# Patient Record
Sex: MALE | Race: OTHER | HISPANIC OR LATINO | URBAN - METROPOLITAN AREA
[De-identification: names, ages, dates, MRNs, and addresses within clinical notes are randomized per-mention and may not be internally consistent; named-entity substitution may affect disease eponyms.]

---

## 2023-09-09 ENCOUNTER — EMERGENCY (EMERGENCY)
Facility: HOSPITAL | Age: 33
LOS: 1 days | Discharge: HOME | End: 2023-09-09
Attending: EMERGENCY MEDICINE
Payer: MEDICAID

## 2023-09-09 VITALS
DIASTOLIC BLOOD PRESSURE: 70 MMHG | RESPIRATION RATE: 16 BRPM | OXYGEN SATURATION: 98 % | TEMPERATURE: 98 F | HEIGHT: 70 IN | WEIGHT: 214.95 LBS | SYSTOLIC BLOOD PRESSURE: 107 MMHG | HEART RATE: 61 BPM

## 2023-09-09 PROCEDURE — 96375 TX/PRO/DX INJ NEW DRUG ADDON: CPT

## 2023-09-09 PROCEDURE — 99284 EMERGENCY DEPT VISIT MOD MDM: CPT

## 2023-09-09 PROCEDURE — 99284 EMERGENCY DEPT VISIT MOD MDM: CPT | Mod: 25

## 2023-09-09 PROCEDURE — 96374 THER/PROPH/DIAG INJ IV PUSH: CPT

## 2023-09-09 RX ORDER — METOCLOPRAMIDE HCL 10 MG
10 TABLET ORAL ONCE
Refills: 0 | Status: COMPLETED | OUTPATIENT
Start: 2023-09-09 | End: 2023-09-09

## 2023-09-09 RX ORDER — SODIUM CHLORIDE 9 MG/ML
1000 INJECTION INTRAMUSCULAR; INTRAVENOUS; SUBCUTANEOUS ONCE
Refills: 0 | Status: COMPLETED | OUTPATIENT
Start: 2023-09-09 | End: 2023-09-09

## 2023-09-09 RX ORDER — KETOROLAC TROMETHAMINE 30 MG/ML
15 SYRINGE (ML) INJECTION ONCE
Refills: 0 | Status: DISCONTINUED | OUTPATIENT
Start: 2023-09-09 | End: 2023-09-09

## 2023-09-09 RX ADMIN — SODIUM CHLORIDE 1000 MILLILITER(S): 9 INJECTION INTRAMUSCULAR; INTRAVENOUS; SUBCUTANEOUS at 18:28

## 2023-09-09 RX ADMIN — Medication 10 MILLIGRAM(S): at 18:28

## 2023-09-09 RX ADMIN — Medication 15 MILLIGRAM(S): at 18:28

## 2023-09-09 NOTE — ED PROVIDER NOTE - PROGRESS NOTE DETAILS
Jarad PGY3  Patient reassessed and reports that his headache is completely gone. Will DC with PMD follow up. Patient has an appointment with neurology in October.

## 2023-09-09 NOTE — ED ADULT NURSE NOTE - OBJECTIVE STATEMENT
States he has headache and sensitivity to light since this morning with episode of dizziness this morning .Denies fall or injury.

## 2023-09-09 NOTE — ED PROVIDER NOTE - PHYSICAL EXAMINATION
Vitals: I have reviewed the patients vital signs  General: Well dressed, well appearing, no acute distress  HEENT: Atraumatic, normocephalic, airway patent  Eyes: EOMI, tracking appropriately  Neck: no tracheal deviation, no JVD, no nuchal rigidity   Chest/Lungs: symmetric chest rise, speaking in complete sentences, CTA BL   Heart: skin and extremities well perfused, regular rate and rhythm  Abdomen: soft, nontender and nondistended   Neuro: A+Ox3, ambulating without difficulty, CN II-XI intact, intact gait without ataxia   MSK: 5/5 strength and normal sensation in all 4 extremities   Skin: no cyanosis, no jaundice, no new emergent lesions

## 2023-09-09 NOTE — ED PROVIDER NOTE - NSFOLLOWUPINSTRUCTIONS_ED_ALL_ED_FT
You were seen in the emergency department for headache.   Please follow up with your primary care doctor within 48 hours for continuation of care.   Please follow up with your neurologist for further management of headache.   Return to the emergency department if you experience any new/concerning/worsening symptoms such as but not limited to: fever (>100.3F), intractable nausea, vomiting, chest pain, shortness of breath, abdominal pain, worsening headache.     For pain, you may take:  1) Acetaminophen (Tylenol): 500mg every 6 hours as needed for pain   2) Ibuprofen (Advil/Motrin): 600mg every 6 hours as needed for pain

## 2023-09-09 NOTE — ED PROVIDER NOTE - OBJECTIVE STATEMENT
Patient is a 33 year-old-male with history of migraine headache presents with headache. 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 Went to urgent care and recommended visit to ED. Patient took tylenol and went home to sleep but woke up with persistent headache. Currently describing 4/10 pain. Similar to his previous migraine headache. Reports photophobia with associated nausea and lightheadedness. Denies fever, chills, night sweats, chest pain, shortness of breath, abdominal pain, urinary or bowel complaints. Follows with neurology for headache and had neuroimaging done in the past which was negative.

## 2023-09-09 NOTE — ED PROVIDER NOTE - CLINICAL SUMMARY MEDICAL DECISION MAKING FREE TEXT BOX
Patient is a 33 year-old-male with history of migraine headache presents with headache. Likely primary headache. Low suspicion for SAH/meningitis/encephalitis. Discussed with patient at length regarding indication/benefits/risk for CTH and given current clinical presentation low suspicion for SAH. Patient agrees that no indication for CTH at this point. Will provide symptomatic treatment and reassess. If patient not improving with medications, will consider pursuing neuroimaging at that point.

## 2023-09-09 NOTE — ED PROVIDER NOTE - PATIENT PORTAL LINK FT
You can access the FollowMyHealth Patient Portal offered by [facility] by registering at the following website: [email or web address]. By joining [employer]’s FollowMyHealth portal, you will also be able to view your health information using other applications (apps) compatible with our system.

## 2023-09-09 NOTE — ED ADULT TRIAGE NOTE - CHIEF COMPLAINT QUOTE
headache with sensitive to light, started today, had episode of dizziness in AM, denies any dizziness now.

## 2024-02-16 ENCOUNTER — EMERGENCY (EMERGENCY)
Facility: HOSPITAL | Age: 34
LOS: 1 days | Discharge: HOME | End: 2024-02-16
Attending: EMERGENCY MEDICINE
Payer: MEDICAID

## 2024-02-16 VITALS
DIASTOLIC BLOOD PRESSURE: 67 MMHG | HEART RATE: 92 BPM | SYSTOLIC BLOOD PRESSURE: 109 MMHG | RESPIRATION RATE: 18 BRPM | TEMPERATURE: 99 F | OXYGEN SATURATION: 97 %

## 2024-02-16 VITALS
TEMPERATURE: 103 F | OXYGEN SATURATION: 96 % | WEIGHT: 220.02 LBS | DIASTOLIC BLOOD PRESSURE: 77 MMHG | SYSTOLIC BLOOD PRESSURE: 127 MMHG | RESPIRATION RATE: 19 BRPM | HEART RATE: 121 BPM | HEIGHT: 70 IN

## 2024-02-16 DIAGNOSIS — Z90.3 ACQUIRED ABSENCE OF STOMACH [PART OF]: Chronic | ICD-10-CM

## 2024-02-16 LAB
ALBUMIN SERPL ELPH-MCNC: 3.5 G/DL — SIGNIFICANT CHANGE UP (ref 3.5–5)
ALP SERPL-CCNC: 111 U/L — SIGNIFICANT CHANGE UP (ref 40–120)
ALT FLD-CCNC: 49 U/L DA — SIGNIFICANT CHANGE UP (ref 10–60)
ANION GAP SERPL CALC-SCNC: 5 MMOL/L — SIGNIFICANT CHANGE UP (ref 5–17)
AST SERPL-CCNC: 32 U/L — SIGNIFICANT CHANGE UP (ref 10–40)
BASOPHILS # BLD AUTO: 0.03 K/UL — SIGNIFICANT CHANGE UP (ref 0–0.2)
BASOPHILS NFR BLD AUTO: 0.2 % — SIGNIFICANT CHANGE UP (ref 0–2)
BILIRUB SERPL-MCNC: 0.7 MG/DL — SIGNIFICANT CHANGE UP (ref 0.2–1.2)
BUN SERPL-MCNC: 10 MG/DL — SIGNIFICANT CHANGE UP (ref 7–18)
CALCIUM SERPL-MCNC: 9.4 MG/DL — SIGNIFICANT CHANGE UP (ref 8.4–10.5)
CHLORIDE SERPL-SCNC: 100 MMOL/L — SIGNIFICANT CHANGE UP (ref 96–108)
CO2 SERPL-SCNC: 29 MMOL/L — SIGNIFICANT CHANGE UP (ref 22–31)
CREAT SERPL-MCNC: 1.11 MG/DL — SIGNIFICANT CHANGE UP (ref 0.5–1.3)
EGFR: 90 ML/MIN/1.73M2 — SIGNIFICANT CHANGE UP
EOSINOPHIL # BLD AUTO: 0.01 K/UL — SIGNIFICANT CHANGE UP (ref 0–0.5)
EOSINOPHIL NFR BLD AUTO: 0.1 % — SIGNIFICANT CHANGE UP (ref 0–6)
FLUAV AG NPH QL: SIGNIFICANT CHANGE UP
FLUBV AG NPH QL: SIGNIFICANT CHANGE UP
GLUCOSE SERPL-MCNC: 100 MG/DL — HIGH (ref 70–99)
HCT VFR BLD CALC: 48.3 % — SIGNIFICANT CHANGE UP (ref 39–50)
HGB BLD-MCNC: 15.4 G/DL — SIGNIFICANT CHANGE UP (ref 13–17)
HIV 1 & 2 AB SERPL IA.RAPID: SIGNIFICANT CHANGE UP
IMM GRANULOCYTES NFR BLD AUTO: 0.7 % — SIGNIFICANT CHANGE UP (ref 0–0.9)
LACTATE SERPL-SCNC: 1 MMOL/L — SIGNIFICANT CHANGE UP (ref 0.7–2)
LYMPHOCYTES # BLD AUTO: 1.5 K/UL — SIGNIFICANT CHANGE UP (ref 1–3.3)
LYMPHOCYTES # BLD AUTO: 8.9 % — LOW (ref 13–44)
MCHC RBC-ENTMCNC: 27.2 PG — SIGNIFICANT CHANGE UP (ref 27–34)
MCHC RBC-ENTMCNC: 31.9 GM/DL — LOW (ref 32–36)
MCV RBC AUTO: 85.2 FL — SIGNIFICANT CHANGE UP (ref 80–100)
MONOCYTES # BLD AUTO: 1.29 K/UL — HIGH (ref 0–0.9)
MONOCYTES NFR BLD AUTO: 7.6 % — SIGNIFICANT CHANGE UP (ref 2–14)
NEUTROPHILS # BLD AUTO: 13.99 K/UL — HIGH (ref 1.8–7.4)
NEUTROPHILS NFR BLD AUTO: 82.5 % — HIGH (ref 43–77)
NRBC # BLD: 0 /100 WBCS — SIGNIFICANT CHANGE UP (ref 0–0)
PLATELET # BLD AUTO: 316 K/UL — SIGNIFICANT CHANGE UP (ref 150–400)
POTASSIUM SERPL-MCNC: 4.3 MMOL/L — SIGNIFICANT CHANGE UP (ref 3.5–5.3)
POTASSIUM SERPL-SCNC: 4.3 MMOL/L — SIGNIFICANT CHANGE UP (ref 3.5–5.3)
PROT SERPL-MCNC: 9 G/DL — HIGH (ref 6–8.3)
RBC # BLD: 5.67 M/UL — SIGNIFICANT CHANGE UP (ref 4.2–5.8)
RBC # FLD: 12.9 % — SIGNIFICANT CHANGE UP (ref 10.3–14.5)
SARS-COV-2 RNA SPEC QL NAA+PROBE: SIGNIFICANT CHANGE UP
SODIUM SERPL-SCNC: 134 MMOL/L — LOW (ref 135–145)
WBC # BLD: 16.94 K/UL — HIGH (ref 3.8–10.5)
WBC # FLD AUTO: 16.94 K/UL — HIGH (ref 3.8–10.5)

## 2024-02-16 PROCEDURE — 96374 THER/PROPH/DIAG INJ IV PUSH: CPT

## 2024-02-16 PROCEDURE — 96375 TX/PRO/DX INJ NEW DRUG ADDON: CPT

## 2024-02-16 PROCEDURE — 83605 ASSAY OF LACTIC ACID: CPT

## 2024-02-16 PROCEDURE — 93005 ELECTROCARDIOGRAM TRACING: CPT

## 2024-02-16 PROCEDURE — 36415 COLL VENOUS BLD VENIPUNCTURE: CPT

## 2024-02-16 PROCEDURE — 86703 HIV-1/HIV-2 1 RESULT ANTBDY: CPT

## 2024-02-16 PROCEDURE — 99284 EMERGENCY DEPT VISIT MOD MDM: CPT | Mod: 25

## 2024-02-16 PROCEDURE — 96361 HYDRATE IV INFUSION ADD-ON: CPT

## 2024-02-16 PROCEDURE — 80053 COMPREHEN METABOLIC PANEL: CPT

## 2024-02-16 PROCEDURE — 87637 SARSCOV2&INF A&B&RSV AMP PRB: CPT

## 2024-02-16 PROCEDURE — 99285 EMERGENCY DEPT VISIT HI MDM: CPT

## 2024-02-16 PROCEDURE — 85025 COMPLETE CBC W/AUTO DIFF WBC: CPT

## 2024-02-16 RX ORDER — ONDANSETRON 8 MG/1
4 TABLET, FILM COATED ORAL ONCE
Refills: 0 | Status: COMPLETED | OUTPATIENT
Start: 2024-02-16 | End: 2024-02-16

## 2024-02-16 RX ORDER — SODIUM CHLORIDE 9 MG/ML
2000 INJECTION, SOLUTION INTRAVENOUS ONCE
Refills: 0 | Status: COMPLETED | OUTPATIENT
Start: 2024-02-16 | End: 2024-02-16

## 2024-02-16 RX ORDER — KETOROLAC TROMETHAMINE 30 MG/ML
15 SYRINGE (ML) INJECTION ONCE
Refills: 0 | Status: DISCONTINUED | OUTPATIENT
Start: 2024-02-16 | End: 2024-02-16

## 2024-02-16 RX ORDER — ONDANSETRON 8 MG/1
1 TABLET, FILM COATED ORAL
Qty: 30 | Refills: 0
Start: 2024-02-16 | End: 2024-02-20

## 2024-02-16 RX ADMIN — Medication 15 MILLIGRAM(S): at 11:53

## 2024-02-16 RX ADMIN — Medication 300 MILLIGRAM(S): at 08:33

## 2024-02-16 RX ADMIN — SODIUM CHLORIDE 2000 MILLILITER(S): 9 INJECTION, SOLUTION INTRAVENOUS at 11:53

## 2024-02-16 RX ADMIN — ONDANSETRON 4 MILLIGRAM(S): 8 TABLET, FILM COATED ORAL at 08:33

## 2024-02-16 RX ADMIN — SODIUM CHLORIDE 2000 MILLILITER(S): 9 INJECTION, SOLUTION INTRAVENOUS at 08:33

## 2024-02-16 RX ADMIN — Medication 15 MILLIGRAM(S): at 08:33

## 2024-02-16 NOTE — ED PROVIDER NOTE - NSFOLLOWUPINSTRUCTIONS_ED_ALL_ED_FT
Please follow up with your PMD or Medicine Clinic in 2-3 days.  Return to the ER for worsening or concerning symptoms.  Drink plenty of fluids or an oral rehydration solution like Pedialyte, Gatorade, or Powerade.  Keep your diet simple until symptoms improve. Introduce foods as tolerated such as bread, toast, plain rice, boiled potatoes, boiled chicken, bananas, apple sauce, etc. Avoid dairy, spicy, greasy, or fatty foods. Avoid alcohol or tobacco.  Quarantine at home for 5-10 days after the start of symptoms. Isolate from any family members you live with   Take Ibuprofen (Advil or Motrin) 600mg every 6 hours as needed for pain or fever with food.  Take Acetaminophen (Tylenol) 650mg every 6 hours as needed for pain or fever.  Take Clindamycin (Cleocin) three times a day for 7 days.  Take Ondansetron (Zofran) 4mg every 6 hours as needed for nausea or vomiting.    - - - - - - - - - - - - -  Pharyngitis  Upper body outline including the pharynx.  Pharyngitis is inflammation of the throat (pharynx). It is a very common cause of sore throat. Pharyngitis can be caused by a bacteria, but it is usually caused by a virus. Most cases of pharyngitis get better on their own without treatment.    What are the causes?  This condition may be caused by:  Infection by viruses (viral). Viral pharyngitis spreads easily from person to person (is contagious) through coughing, sneezing, and sharing of personal items or utensils such as cups, forks, spoons, and toothbrushes.  Infection by bacteria (bacterial). Bacterial pharyngitis may be spread by touching the nose or face after coming in contact with the bacteria, or through close contact, such as kissing.  Allergies. Allergies can cause buildup of mucus in the throat (post-nasal drip), leading to inflammation and irritation. Allergies can also cause blocked nasal passages, forcing breathing through the mouth, which dries and irritates the throat.  What increases the risk?  You are more likely to develop this condition if:  You are 5–24 years old.  You are exposed to crowded environments such as , school, or dormitory living.  You live in a cold climate.  You have a weakened disease-fighting (immune) system.  What are the signs or symptoms?  Symptoms of this condition vary by the cause. Common symptoms of this condition include:  Sore throat.  Fatigue.  Low-grade fever.  Stuffy nose (nasal congestion) and cough.  Headache.  Other symptoms may include:  Glands in the neck (lymph nodes) that are swollen.  Skin rashes.  Plaque-like film on the throat or tonsils. This is often a symptom of bacterial pharyngitis.  Vomiting.  Red, itchy eyes (conjunctivitis).  Loss of appetite.  Joint pain and muscle aches.  Enlarged tonsils.  How is this diagnosed?  This condition may be diagnosed based on your medical history and a physical exam. Your health care provider will ask you questions about your illness and your symptoms.    A swab of your throat may be done to check for bacteria (rapid strep test). Other lab tests may also be done, depending on the suspected cause, but these are rare.    How is this treated?  Many times, treatment is not needed for this condition. Pharyngitis usually gets better in 3–4 days without treatment.    Bacterial pharyngitis may be treated with antibiotic medicines.    Follow these instructions at home:  Medicines    Take over-the-counter and prescription medicines only as told by your health care provider.  If you were prescribed an antibiotic medicine, take it as told by your health care provider. Do not stop taking the antibiotic even if you start to feel better.  Use throat sprays to soothe your throat as told by your health care provider.  Children can get pharyngitis. Do not give your child aspirin because of the association with Reye's syndrome.  Managing pain    A cup of hot tea.  To help with pain, try:  Sipping warm liquids, such as broth, herbal tea, or warm water.  Eating or drinking cold or frozen liquids, such as frozen ice pops.  Gargling with a mixture of salt and water 3–4 times a day or as needed. To make salt water, completely dissolve ½–1 tsp (3–6 g) of salt in 1 cup (237 mL) of warm water.  Sucking on hard candy or throat lozenges.  Putting a cool-mist humidifier in your bedroom at night to moisten the air.  Sitting in the bathroom with the door closed for 5–10 minutes while you run hot water in the shower.  General instructions    A do not smoke cigarettes sign.  Do not use any products that contain nicotine or tobacco. These products include cigarettes, chewing tobacco, and vaping devices, such as e-cigarettes. If you need help quitting, ask your health care provider.  Rest as told by your health care provider.  Drink enough fluid to keep your urine pale yellow.  How is this prevented?  To help prevent becoming infected or spreading infection:  Wash your hands often with soap and water for at least 20 seconds. If soap and water are not available, use hand .  Do not touch your eyes, nose, or mouth with unwashed hands, and wash hands after touching these areas.  Do not share cups or eating utensils.  Avoid close contact with people who are sick.  Contact a health care provider if:  You have large, tender lumps in your neck.  You have a rash.  You cough up green, yellow-brown, or bloody mucus.  Get help right away if:  Your neck becomes stiff.  You drool or are unable to swallow liquids.  You cannot drink or take medicines without vomiting.  You have severe pain that does not go away, even after you take medicine.  You have trouble breathing, and it is not caused by a stuffy nose.  You have new pain and swelling in your joints such as the knees, ankles, wrists, or elbows.  These symptoms may represent a serious problem that is an emergency. Do not wait to see if the symptoms will go away. Get medical help right away. Call your local emergency services (911 in the [place] Do not drive yourself to the hospital.    Summary  Pharyngitis is redness, pain, and swelling (inflammation) of the throat (pharynx).  While pharyngitis can be caused by a bacteria, the most common causes are viral.  Most cases of pharyngitis get better on their own without treatment.  Bacterial pharyngitis is treated with antibiotic medicines.  This information is not intended to replace advice given to you by your health care provider. Make sure you discuss any questions you have with your health care provider.

## 2024-02-16 NOTE — ED PROVIDER NOTE - CLINICAL SUMMARY MEDICAL DECISION MAKING FREE TEXT BOX
33 male with hx of no known medical problems.  Prior bariatric surgery.  Pt presenting to the ED reporting 2 days of fever, chills, myalgias, malaise, & sore throat.  + Nausea/vomiting x 2.  Daughter with similar symptoms earlier this week.    Vitals with fever & tachycardia.  Nontoxic appearing, n/v intact.  Airway intact, no respiratory distress, no hypoxia.  No abdominal or CVA tenderness.  + Pharyngeal erythema/exudates & cervical lymphadenopathy. Centor 4. No evidence of PTA requiring drainage.    Plan to obtain:    -Labs, (including COVID/flu), IV fluids, analgesia/antipyretics as needed, antiemetics as needed, ABX for likely bacterial pharyngitis, discharge with PMD follow-up.    Lab values demonstrate no acute/emergent pathology.  My independent interpretation of the triage EKG: Sinus @ 113, normal axis, normal intervals, normal ST/T  IV fluids, Abx, antiemetics, & analgesia given w improved symptoms  Pt advised regarding need for close outpatient follow up.  Patient stable for further care in outpatient setting. No indication for inpatient admission at this time. Patient advised regarding symptomatic & supportive care and symptoms to prompt ED return. Strict return precautions provided. 33 male with hx of no known medical problems.  Prior bariatric surgery.  Pt presenting to the ED reporting 2 days of fever, chills, myalgias, malaise, & sore throat.  + Nausea/vomiting x 2.  Daughter with similar symptoms earlier this week.    Vitals with fever & tachycardia.  Nontoxic appearing, n/v intact.  Airway intact, no respiratory distress, no hypoxia.  No abdominal or CVA tenderness.  + Pharyngeal erythema/exudates & cervical lymphadenopathy. Centor 4. No evidence of PTA requiring drainage.    Plan to obtain:    -Labs, (including COVID/flu), IV fluids, analgesia/antipyretics as needed, antiemetics as needed, ABX for likely bacterial pharyngitis, discharge with PMD follow-up.    Lab values w leukocytosis. Lactate normal. Flu/COVID negative  My independent interpretation of the triage EKG: Sinus @ 113, normal axis, normal intervals, normal ST/T  IV fluids, Abx, antiemetics, & analgesia given w improved symptoms  Pt advised regarding need for close outpatient follow up.  Patient stable for further care in outpatient setting. No indication for inpatient admission at this time. Patient advised regarding symptomatic & supportive care and symptoms to prompt ED return. Strict return precautions provided.

## 2024-02-16 NOTE — ED PROVIDER NOTE - PHYSICAL EXAMINATION
Gen:  Awake, alert, NAD, WDWN, NCAT, non-toxic appearing.   Eyes:  PERRL, EOMI, no icterus, normal lids/lashes, normal conjunctivae.  ENT:  External inspection normal, pink/moist membranes. +Pharyngeal erythema/exudate b/l, no drooling, no lip/tongue/palate/posterior pharynx edema, midline uvula, no oropharyngeal ulcerations/lesions.  CV:  S1S2, +tachycardia, regular rhythm, no murmur/gallops/rubs, no JVD, 2+ pulses b/l, no edema/cords/homans, warm/well-perfused.  Resp:  Normal respiratory rate/effort, no respiratory distress, normal voice, speaking full sentences, lungs clear to auscultation b/l, no wheezing/rales/rhonchi, no retractions, no stridor.  Abd:  Soft abdomen, no tender/distended/guarding/rebound, no pulsatile mass, no CVA tender.   Musculoskeletal:  N/V intact, FROM all 4 extremities, normal motor tone, stable gait.   Neck:  FROM neck, supple, trachea midline, no meningismus. +Cervical LAD  Skin:  Color normal for race, warm and dry, no rash.  Neuro:  Oriented x3, CN 2-12 intact (grossly), normal motor (grossly), normal sensory (grossly), normal gait. GCS 15  Psych:  Attention normal. Affect normal. Behavior normal. Judgment normal.

## 2024-02-16 NOTE — ED ADULT NURSE NOTE - OBJECTIVE STATEMENT
Pt reports that starting yesterday he has been having fevers, chills , body aches, nausea and vomiting. Pt c/o of generalized body aches/pain.  Pt denies recent falls or exposures to any sick contacts.

## 2024-02-16 NOTE — ED ADULT NURSE NOTE - CHPI ED NUR SYMPTOMS POS
Contact [facility] [phone] to schedule MRI cervical    Once imaging is complete and Dr. Meyer reviews, office will contact you with results/recommendations     Dr. Scanlon office will contact you to schedule angiogram   
FEVER/NAUSEA/PAIN

## 2024-02-16 NOTE — ED PROVIDER NOTE - OBJECTIVE STATEMENT
33 male with hx of no known medical problems.  Prior bariatric surgery.  Pt presenting to the ED reporting 2 days of fever, chills, myalgias, malaise, & sore throat.  + Nausea/vomiting x 2.  Daughter with similar symptoms earlier this week.

## 2024-02-16 NOTE — ED PROVIDER NOTE - NSFOLLOWUPCLINICS_GEN_ALL_ED_FT
[facility]  Internal Medicine  [address] [place] [address]  Phone: [phone]  Fax: [phone]  Follow Up Time: 1-3 Days

## 2024-02-16 NOTE — ED PROVIDER NOTE - NS ED ROS FT
Constitutional: (+) fever (+) chills (+) malaise  HENT: (-) congestion (-) rhinorrhea (+) sore throat  Eyes: (-) pain (-) redness  Respiratory: (-) cough (-) shortness of breath (-) wheezing (-) stridor    Cardiovascular: (-) chest pain (-) palpitations (-) leg swelling  Gastrointestinal: (-) abdominal pain (-) blood in stool (no melena/hematochezia) (-) diarrhea (+) vomiting x2 [no blood/bile]  Genitourinary: (-) dysuria (-) hematuria  Musculoskeletal: (-) gait problem (-) joint swelling (+) myalgias  Skin: (-) color change (-) rash  Neurological: (-) weakness (-) numbness (-) headaches  Psychiatric/Behavioral: (-) confusion

## 2024-02-16 NOTE — ED PROVIDER NOTE - PROGRESS NOTE DETAILS
Results reviewed.  Pt reports feeling better.  Tolerating PO intake.  Pt advised regarding symptomatic/supportive care, importance of outpatient follow up, and symptoms to prompt ED return.

## 2024-02-16 NOTE — ED PROVIDER NOTE - PATIENT PORTAL LINK FT
You can access the FollowMyHealth Patient Portal offered by [facility] by registering at the following website: [email or web address]. By joining [employer]’s FollowMyHealth portal, you will also be able to view your health information using other applications (apps) compatible with our system.

## 2024-02-16 NOTE — ED ADULT NURSE NOTE - NSFALLUNIVINTERV_ED_ALL_ED
Bed/Stretcher in lowest position, wheels locked, appropriate side rails in place/Call bell, personal items and telephone in reach/Instruct patient to call for assistance before getting out of bed/chair/stretcher/Non-slip footwear applied when patient is off stretcher/[place] to call system/Physically safe environment - no spills, clutter or unnecessary equipment/Purposeful proactive rounding/Room/bathroom lighting operational, light cord in reach

## 2024-04-26 NOTE — ED ADULT NURSE NOTE - NS ED NOTE ABUSE SUSPICION NEGLECT YN
I called patient to give him his appointment information but we got disconnected. I tried to call back but it went to voicemail. Patient is scheduled to see Moise on 5/6 8:00 am.    OK for [facility] to confirm message or reschedule.   No

## 2024-09-20 ENCOUNTER — EMERGENCY (EMERGENCY)
Facility: HOSPITAL | Age: 34
LOS: 1 days | Discharge: HOME | End: 2024-09-20
Attending: STUDENT IN AN ORGANIZED HEALTH CARE EDUCATION/TRAINING PROGRAM
Payer: SELF-PAY

## 2024-09-20 VITALS
WEIGHT: 199.96 LBS | RESPIRATION RATE: 16 BRPM | DIASTOLIC BLOOD PRESSURE: 84 MMHG | OXYGEN SATURATION: 96 % | SYSTOLIC BLOOD PRESSURE: 130 MMHG | HEART RATE: 82 BPM | HEIGHT: 70 IN | TEMPERATURE: 99 F

## 2024-09-20 DIAGNOSIS — Z90.3 ACQUIRED ABSENCE OF STOMACH [PART OF]: Chronic | ICD-10-CM

## 2024-09-20 PROCEDURE — 99284 EMERGENCY DEPT VISIT MOD MDM: CPT

## 2024-09-20 PROCEDURE — 99283 EMERGENCY DEPT VISIT LOW MDM: CPT | Mod: 25

## 2024-09-20 PROCEDURE — 96372 THER/PROPH/DIAG INJ SC/IM: CPT

## 2024-09-20 RX ORDER — KETOROLAC TROMETHAMINE 30 MG/ML
15 INJECTION, SOLUTION INTRAMUSCULAR ONCE
Refills: 0 | Status: DISCONTINUED | OUTPATIENT
Start: 2024-09-20 | End: 2024-09-20

## 2024-09-20 RX ORDER — CYCLOBENZAPRINE HCL 10 MG
1 TABLET ORAL
Qty: 6 | Refills: 0
Start: 2024-09-20 | End: 2024-09-21

## 2024-09-20 RX ORDER — ACETAMINOPHEN 325 MG/1
975 TABLET ORAL ONCE
Refills: 0 | Status: COMPLETED | OUTPATIENT
Start: 2024-09-20 | End: 2024-09-20

## 2024-09-20 RX ORDER — LIDOCAINE/BENZALKONIUM/ALCOHOL
1 SOLUTION, NON-ORAL TOPICAL ONCE
Refills: 0 | Status: COMPLETED | OUTPATIENT
Start: 2024-09-20 | End: 2024-09-20

## 2024-09-20 RX ADMIN — ACETAMINOPHEN 975 MILLIGRAM(S): 325 TABLET ORAL at 21:28

## 2024-09-20 RX ADMIN — KETOROLAC TROMETHAMINE 15 MILLIGRAM(S): 30 INJECTION, SOLUTION INTRAMUSCULAR at 20:29

## 2024-09-20 RX ADMIN — KETOROLAC TROMETHAMINE 15 MILLIGRAM(S): 30 INJECTION, SOLUTION INTRAMUSCULAR at 21:29

## 2024-09-20 RX ADMIN — Medication 1 PATCH: at 20:29

## 2024-09-20 RX ADMIN — ACETAMINOPHEN 975 MILLIGRAM(S): 325 TABLET ORAL at 20:29
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
## 2024-09-20 NOTE — ED PROVIDER NOTE - PROGRESS NOTE DETAILS
Patient states improvement in pain, ambulating with steady coordinated gait in ED.  Red flag signs and symptoms discussed as well as strict return precautions given patient verbalized understanding.

## 2024-09-20 NOTE — ED ADULT TRIAGE NOTE - INTERNATIONAL TRAVEL
Yolette Benavidez   No

## 2024-09-20 NOTE — ED PROVIDER NOTE - PHYSICAL EXAMINATION
Gen: NAD, AOx3, able to make needs known, non-toxic  Head: NCAT  HEENT: EOMI, oral mucosa moist, normal conjunctiva  Lung: CTAB, no respiratory distress, no wheezes/rhonchi/rales B/L, speaking in full sentences  CV: RRR, no murmurs  Abd: non distended, soft, nontender, no guarding, no CVA tenderness  MSK: no visible deformities,   No midline spinal tenderness, + cspine paraspinal ttp, FROM upper and lower extremities, 5/5 strength in all 4 extremities, sensation intact   Neuro: Appears non focal  Skin: Warm, well perfused, no rash  Psych: normal affect
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
## 2024-09-20 NOTE — ED PROVIDER NOTE - NSTIMEPROVIDERCAREINITIATE_GEN_ER
20-Sep-2024 19:07
History of partial thyroidectomy    Other postprocedural status  Left and right shoulder x 2  Status post cataract extraction  S/P cataract surgery  bilateral

## 2024-09-20 NOTE — ED ADULT NURSE NOTE - NSFALLUNIVINTERV_ED_ALL_ED
Bed/Stretcher in lowest position, wheels locked, appropriate side rails in place/Call bell, personal items and telephone in reach/Instruct patient to call for assistance before getting out of bed/chair/stretcher/Non-slip footwear applied when patient is off stretcher/[place] to call system/Physically safe environment - no spills, clutter or unnecessary equipment/Purposeful proactive rounding/Room/bathroom lighting operational, light cord in reach
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
## 2024-09-20 NOTE — ED PROVIDER NOTE - PATIENT PORTAL LINK FT
You can access the FollowMyHealth Patient Portal offered by [facility] by registering at the following website: [email or web address]. By joining [facility]’s FollowMyHealth portal, you will also be able to view your health information using other applications (apps) compatible with our system.

## 2024-11-15 ENCOUNTER — TRANSCRIPTION ENCOUNTER (OUTPATIENT)
Age: 34
End: 2024-11-15

## 2024-11-15 ENCOUNTER — APPOINTMENT (OUTPATIENT)
Dept: PULMONOLOGY | Facility: CLINIC | Age: 34
End: 2024-11-15

## 2024-11-15 VITALS
SYSTOLIC BLOOD PRESSURE: 130 MMHG | OXYGEN SATURATION: 96 % | BODY MASS INDEX: 35.98 KG/M2 | DIASTOLIC BLOOD PRESSURE: 79 MMHG | TEMPERATURE: 98 F | HEART RATE: 82 BPM | WEIGHT: 257 LBS | HEIGHT: 71 IN

## 2024-11-15 DIAGNOSIS — Z80.1 FAMILY HISTORY OF MALIGNANT NEOPLASM OF TRACHEA, BRONCHUS AND LUNG: ICD-10-CM

## 2024-11-15 DIAGNOSIS — Z78.9 OTHER SPECIFIED HEALTH STATUS: ICD-10-CM

## 2024-11-15 DIAGNOSIS — G47.9 SLEEP DISORDER, UNSPECIFIED: ICD-10-CM

## 2024-11-15 PROBLEM — Z00.00 ENCOUNTER FOR PREVENTIVE HEALTH EXAMINATION: Status: ACTIVE | Noted: 2024-11-15

## 2024-11-15 PROCEDURE — 94060 EVALUATION OF WHEEZING: CPT

## 2024-11-15 PROCEDURE — 94729 DIFFUSING CAPACITY: CPT

## 2024-11-15 PROCEDURE — 94727 GAS DIL/WSHOT DETER LNG VOL: CPT

## 2024-11-15 PROCEDURE — 99203 OFFICE O/P NEW LOW 30 MIN: CPT | Mod: 25

## 2025-01-24 ENCOUNTER — NON-APPOINTMENT (OUTPATIENT)
Age: 35
End: 2025-01-24

## 2025-01-24 ENCOUNTER — APPOINTMENT (OUTPATIENT)
Dept: PULMONOLOGY | Facility: CLINIC | Age: 35
End: 2025-01-24
Payer: MEDICAID

## 2025-01-24 VITALS
HEART RATE: 107 BPM | SYSTOLIC BLOOD PRESSURE: 130 MMHG | BODY MASS INDEX: 35.98 KG/M2 | DIASTOLIC BLOOD PRESSURE: 82 MMHG | TEMPERATURE: 98.6 F | WEIGHT: 258 LBS | OXYGEN SATURATION: 97 %

## 2025-01-24 DIAGNOSIS — G47.30 SLEEP APNEA, UNSPECIFIED: ICD-10-CM

## 2025-01-24 PROCEDURE — 99214 OFFICE O/P EST MOD 30 MIN: CPT
